# Patient Record
Sex: FEMALE | Race: WHITE
[De-identification: names, ages, dates, MRNs, and addresses within clinical notes are randomized per-mention and may not be internally consistent; named-entity substitution may affect disease eponyms.]

---

## 2018-06-15 ENCOUNTER — HOSPITAL ENCOUNTER (INPATIENT)
Dept: HOSPITAL 17 - H2EA | Age: 28
LOS: 3 days | Discharge: HOME | End: 2018-06-18
Attending: OBSTETRICS & GYNECOLOGY | Admitting: OBSTETRICS & GYNECOLOGY
Payer: MEDICAID

## 2018-06-15 VITALS
DIASTOLIC BLOOD PRESSURE: 61 MMHG | HEART RATE: 62 BPM | SYSTOLIC BLOOD PRESSURE: 114 MMHG | OXYGEN SATURATION: 100 % | RESPIRATION RATE: 17 BRPM

## 2018-06-15 VITALS — SYSTOLIC BLOOD PRESSURE: 120 MMHG | DIASTOLIC BLOOD PRESSURE: 59 MMHG

## 2018-06-15 VITALS — HEART RATE: 77 BPM | OXYGEN SATURATION: 100 %

## 2018-06-15 VITALS — HEART RATE: 61 BPM | OXYGEN SATURATION: 100 % | RESPIRATION RATE: 18 BRPM

## 2018-06-15 VITALS — BODY MASS INDEX: 34.41 KG/M2 | WEIGHT: 227.08 LBS | HEIGHT: 68 IN

## 2018-06-15 VITALS
RESPIRATION RATE: 20 BRPM | SYSTOLIC BLOOD PRESSURE: 123 MMHG | OXYGEN SATURATION: 100 % | DIASTOLIC BLOOD PRESSURE: 64 MMHG | HEART RATE: 66 BPM | TEMPERATURE: 97.7 F

## 2018-06-15 VITALS
SYSTOLIC BLOOD PRESSURE: 126 MMHG | DIASTOLIC BLOOD PRESSURE: 77 MMHG | RESPIRATION RATE: 15 BRPM | HEART RATE: 74 BPM | OXYGEN SATURATION: 100 %

## 2018-06-15 VITALS
HEART RATE: 58 BPM | RESPIRATION RATE: 16 BRPM | TEMPERATURE: 98.2 F | SYSTOLIC BLOOD PRESSURE: 100 MMHG | DIASTOLIC BLOOD PRESSURE: 60 MMHG

## 2018-06-15 VITALS
DIASTOLIC BLOOD PRESSURE: 74 MMHG | SYSTOLIC BLOOD PRESSURE: 128 MMHG | RESPIRATION RATE: 18 BRPM | OXYGEN SATURATION: 100 % | HEART RATE: 74 BPM

## 2018-06-15 VITALS — TEMPERATURE: 97.7 F

## 2018-06-15 VITALS — HEART RATE: 76 BPM

## 2018-06-15 VITALS — DIASTOLIC BLOOD PRESSURE: 58 MMHG | SYSTOLIC BLOOD PRESSURE: 115 MMHG

## 2018-06-15 VITALS — DIASTOLIC BLOOD PRESSURE: 66 MMHG | SYSTOLIC BLOOD PRESSURE: 115 MMHG

## 2018-06-15 VITALS — RESPIRATION RATE: 17 BRPM

## 2018-06-15 VITALS — HEART RATE: 81 BPM

## 2018-06-15 DIAGNOSIS — Z3A.39: ICD-10-CM

## 2018-06-15 DIAGNOSIS — Z87.891: ICD-10-CM

## 2018-06-15 LAB
BASOPHILS # BLD AUTO: 0 TH/MM3 (ref 0–0.2)
BASOPHILS NFR BLD: 0.4 % (ref 0–2)
COLOR UR: YELLOW
EOSINOPHIL # BLD: 0.1 TH/MM3 (ref 0–0.4)
EOSINOPHIL NFR BLD: 0.6 % (ref 0–4)
ERYTHROCYTE [DISTWIDTH] IN BLOOD BY AUTOMATED COUNT: 12.6 % (ref 11.6–17.2)
GLUCOSE UR STRIP-MCNC: (no result) MG/DL
HCT VFR BLD CALC: 37.1 % (ref 35–46)
HGB BLD-MCNC: 12.5 GM/DL (ref 11.6–15.3)
HGB UR QL STRIP: (no result)
KETONES UR STRIP-MCNC: (no result) MG/DL
LYMPHOCYTES # BLD AUTO: 1.5 TH/MM3 (ref 1–4.8)
LYMPHOCYTES NFR BLD AUTO: 16.4 % (ref 9–44)
MCH RBC QN AUTO: 30.4 PG (ref 27–34)
MCHC RBC AUTO-ENTMCNC: 33.8 % (ref 32–36)
MCV RBC AUTO: 90 FL (ref 80–100)
MONOCYTE #: 0.6 TH/MM3 (ref 0–0.9)
MONOCYTES NFR BLD: 6.9 % (ref 0–8)
MUCOUS THREADS #/AREA URNS LPF: (no result) /LPF
NEUTROPHILS # BLD AUTO: 7.1 TH/MM3 (ref 1.8–7.7)
NEUTROPHILS NFR BLD AUTO: 75.7 % (ref 16–70)
NITRITE UR QL STRIP: (no result)
PLATELET # BLD: 280 TH/MM3 (ref 150–450)
PMV BLD AUTO: 8.1 FL (ref 7–11)
RBC # BLD AUTO: 4.12 MIL/MM3 (ref 4–5.3)
SP GR UR STRIP: 1.03 (ref 1–1.03)
SQUAMOUS #/AREA URNS HPF: 12 /HPF (ref 0–5)
URINE LEUKOCYTE ESTERASE: (no result)
WBC # BLD AUTO: 9.3 TH/MM3 (ref 4–11)

## 2018-06-15 PROCEDURE — 86901 BLOOD TYPING SEROLOGIC RH(D): CPT

## 2018-06-15 PROCEDURE — 81001 URINALYSIS AUTO W/SCOPE: CPT

## 2018-06-15 PROCEDURE — 86850 RBC ANTIBODY SCREEN: CPT

## 2018-06-15 PROCEDURE — 85461 HEMOGLOBIN FETAL: CPT

## 2018-06-15 PROCEDURE — 85025 COMPLETE CBC W/AUTO DIFF WBC: CPT

## 2018-06-15 PROCEDURE — 59025 FETAL NON-STRESS TEST: CPT

## 2018-06-15 PROCEDURE — 90384 RH IG FULL-DOSE IM: CPT

## 2018-06-15 PROCEDURE — 80307 DRUG TEST PRSMV CHEM ANLYZR: CPT

## 2018-06-15 PROCEDURE — 86900 BLOOD TYPING SEROLOGIC ABO: CPT

## 2018-06-15 PROCEDURE — 90715 TDAP VACCINE 7 YRS/> IM: CPT

## 2018-06-15 RX ADMIN — CEFAZOLIN SODIUM SCH MLS/HR: 2 SOLUTION INTRAVENOUS at 23:59

## 2018-06-15 RX ADMIN — ACETAMINOPHEN SCH MLS/HR: 10 INJECTION, SOLUTION INTRAVENOUS at 23:59

## 2018-06-15 NOTE — HHI.HP
History & Physical


H&P





Patient Name: Ericka Shaffer Unit Number: [id number]


YOB: 1990 Patient Status: Registered Clinic


Attending Doctor: Iker Koenig MD Account Number: [id number]


   


 History & Physical 


History & Physical


H&P


PREOP  CONSULTATION








HPI:  Pt is a 26y/o  @ 39.4wks.  She presents today for  

consultation.  No LOF, VB, ctx.  +FM.  She has PNC at [facility].  She was 

seen in clinic today and found to be breech presentation with ALONA 7cm.  She is 

RH neg (s/p Rhogam).





OBHx:  


TAB x2





PMH:   


denies





PSH:   


wisdom teeth extraction


L clavicular fracture (plated/screwed)





FH:   


denies





SH:  


quit smoking with pregnancy (prior 1PPD x10yrs)


w/e EtOH prior to pregnancy


denies illicits





Meds:   


PNVs





Aller:  


NKDA





PE:  


General:  well developed, well nourished, no acute distress


HEENT:  normocephalic atraumatic, extraocular movements intact, neck supple 


Extremities:  full range of motion


Skin:  normal coloration, no rashes, no suspicious skin lesions noted


Neurologic:  cranial nerves 2-12 grossly intact, normal muscle tone, normal gait


Psychiatric:  normal mood and affect, appropriate





Pertinent Labs:


Rh neg





A/P:  26y/o  @ 39.4wks with breech presentation.





Pt was counseled against breech delivery.  Advised of option for ECV, however 

given advanced gestational age and lower ALONA (7cm), unlikely to be successful.  

She was advised on 1'CS which she agreed to.  Scheduled for Friday, 6/15 @ 15:

30 with Dr. Nelson.  Pt given handout which was reviewed.











Iker Koenig MD Kip 15, 2018 09:58











Daryl Tripp II, MD Kip 15, 2018 14:45

## 2018-06-15 NOTE — MP
cc:

Daryl Tripp MD, Bill L MD

****

 

 

DATE OF OPERATION:

06/15/2018

 

PREOPERATIVE DIAGNOSES:

Term intrauterine pregnancy at 39 weeks, breech presentation for C 

section.

 

POSTOPERATIVE DIAGNOSES:

Term intrauterine pregnancy at 39 weeks, breech presentation for C 

section.

 

PROCEDURE PERFORMED:

Primary low transverse  section.

 

SURGEON:

Daryl Tripp MD

 

ASSISTANT:

Will.

 

ANESTHESIA:

Spinal.

 

PREOPERATIVE NOTE:

The patient is a 27-year-old  female who is G3, P0, A2 at 39 

weeks, found to be breech yesterday when she came here for her visit 

in the ED and then scheduled for  today.

 

PROCEDURE IN DETAIL:

The patient was taken to the operating room and placed in supine 

position on the operating table.  Adequate spinal anesthesia was 

administered.  She was prepped and draped for abdominal surgery.  A 

Pfannenstiel incision was made in lower abdomen and carried to fascia 

sharply.  The fascia was dissected off the rectus muscle and the 

rectus split in the midline and peritoneal cavity entered sharply.  

The incision was extended superiorly and inferiorly.  The bladder 

placed in lower uterine incision.  The visceral peritoneum reflected 

off the lower uterine segment and placed on the bladder blade.  A 

transverse hysterotomy was extended and the baby was delivered from a 

ethan breech presentation at 3:59 p.m., weight 3270 grams, Apgar 8 and

8, a female infant.  There were no complications to delivery.  Delayed

cord clamping noted and cord blood obtained.  The placenta manually 

extracted.  The hysterotomy closed in running layer of 0 chromic, 

followed by imbricating suture of same.  Hemostasis was achieved. The 

was elevated, blood suctioned from cul-de-sac and gutters and the 

uterus replaced in peritoneal cavity.  The bladder peritoneum was 

reapproximated with 2-0 Vicryl running suture.  The parietal 

peritoneum closed in running layer of 2-0 chromic.  The rectus muscle 

reapproximated with stick ties of chromic.  The fascia was then closed

in a running layer of 0 Vicryl, subcutaneous tissues with a 3-0 plain 

catgut suture and the skin closed with 3-0 Monocryl subcuticular 

stitch.  Pressure dressing applied.  The estimated blood loss 500 mL. 

There were no complications.  Sponge and needle correct x2.  The 

patient was taken to the recovery room in stable condition.

 

 

__________________________________

MD WILLI Ramsey/

D: 06/15/2018, 05:10 PM

T: 06/15/2018, 05:32 PM

Visit #: [id number]

Job #: [id number]

## 2018-06-16 VITALS
SYSTOLIC BLOOD PRESSURE: 119 MMHG | HEART RATE: 65 BPM | RESPIRATION RATE: 17 BRPM | TEMPERATURE: 98.2 F | DIASTOLIC BLOOD PRESSURE: 66 MMHG

## 2018-06-16 VITALS
TEMPERATURE: 97.8 F | DIASTOLIC BLOOD PRESSURE: 70 MMHG | SYSTOLIC BLOOD PRESSURE: 110 MMHG | RESPIRATION RATE: 18 BRPM | HEART RATE: 69 BPM

## 2018-06-16 VITALS
RESPIRATION RATE: 18 BRPM | TEMPERATURE: 97.9 F | HEART RATE: 63 BPM | DIASTOLIC BLOOD PRESSURE: 69 MMHG | SYSTOLIC BLOOD PRESSURE: 125 MMHG | OXYGEN SATURATION: 96 %

## 2018-06-16 VITALS
RESPIRATION RATE: 16 BRPM | SYSTOLIC BLOOD PRESSURE: 121 MMHG | HEART RATE: 62 BPM | DIASTOLIC BLOOD PRESSURE: 61 MMHG | TEMPERATURE: 98.5 F

## 2018-06-16 VITALS
HEART RATE: 70 BPM | DIASTOLIC BLOOD PRESSURE: 62 MMHG | OXYGEN SATURATION: 97 % | RESPIRATION RATE: 20 BRPM | SYSTOLIC BLOOD PRESSURE: 109 MMHG | TEMPERATURE: 98 F

## 2018-06-16 VITALS — TEMPERATURE: 97.7 F

## 2018-06-16 RX ADMIN — IBUPROFEN PRN MG: 600 TABLET, FILM COATED ORAL at 20:14

## 2018-06-16 RX ADMIN — OXYCODONE HYDROCHLORIDE AND ACETAMINOPHEN PRN TAB: 5; 325 TABLET ORAL at 22:09

## 2018-06-16 RX ADMIN — OXYCODONE HYDROCHLORIDE AND ACETAMINOPHEN PRN TAB: 5; 325 TABLET ORAL at 07:52

## 2018-06-16 RX ADMIN — OXYCODONE HYDROCHLORIDE AND ACETAMINOPHEN PRN TAB: 5; 325 TABLET ORAL at 13:05

## 2018-06-16 RX ADMIN — OXYCODONE HYDROCHLORIDE AND ACETAMINOPHEN PRN TAB: 5; 325 TABLET ORAL at 17:56

## 2018-06-16 RX ADMIN — CEFAZOLIN SODIUM SCH MLS/HR: 2 SOLUTION INTRAVENOUS at 07:03

## 2018-06-16 RX ADMIN — STANDARDIZED SENNA CONCENTRATE AND DOCUSATE SODIUM PRN TAB: 8.6; 5 TABLET, FILM COATED ORAL at 07:52

## 2018-06-16 RX ADMIN — SIMETHICONE CHEW TAB 80 MG PRN MG: 80 TABLET ORAL at 07:52

## 2018-06-16 RX ADMIN — STANDARDIZED SENNA CONCENTRATE AND DOCUSATE SODIUM PRN TAB: 8.6; 5 TABLET, FILM COATED ORAL at 20:14

## 2018-06-16 RX ADMIN — ACETAMINOPHEN SCH MLS/HR: 10 INJECTION, SOLUTION INTRAVENOUS at 07:38

## 2018-06-16 RX ADMIN — IBUPROFEN PRN MG: 600 TABLET, FILM COATED ORAL at 13:51

## 2018-06-16 NOTE — HHI.OB
Subjective


Post Operative Day:  1


Remarks


27-year-old  delivered at 39 weeks 4 days .  Patient is postop day #1 for 

primary  for breech.  Patient's pain has increased this morning and 

she is going to take Percocet for the pain.  She has only been on IV Tylenol 

since the delivery.  She is eating and drinking without difficulties.  No nausea

/vomiting.  Patient has not passed gas or had a bowel movement yet.  She is 

undecided about contraception and plans on breast-feeding, interested in 

minipill.





Objective


Vitals/I&O





Vital Signs








  Date Time  Temp Pulse Resp B/P (MAP) Pulse Ox O2 Delivery O2 Flow Rate FiO2


 


18 07:39  63 18 125/69 (87) 96   


 


18 07:39 97.9       


 


18 04:26 98.2 65 17 119/66 (83)    


 


18 00:13 98.5 62 16 121/61 (81)    


 


6/15/18 20:43 98.2 58 16 100/60 (73)    


 


6/15/18 18:31  74 15 126/77 (93) 100   


 


6/15/18 18:17 97.7       


 


6/15/18 18:15  62 17 114/61 (78) 100   


 


6/15/18 18:05    115/58 (77)    


 


6/15/18 18:00  61 18  100   


 


6/15/18 17:46  74 18 128/74 (92) 100   


 


6/15/18 17:31    120/59 (79)    


 


6/15/18 17:26   17     


 


6/15/18 17:25  77      


 


6/15/18 17:25     100   


 


6/15/18 17:18     100   


 


6/15/18 17:18 97.7 66 20  97   


 


6/15/18 17:18    123/64 (83)    


 


6/15/18 17:11    115/66 (82)    


 


6/15/18 14:55  76      


 


6/15/18 14:50  81      








Result Diagram:  


6/15/18 1400





Objective Remarks


GENERAL: Well-nourished, well-developed patient.


CARDIOVASCULAR: Regular rate and rhythm without murmurs, gallops, or rubs. 


RESPIRATORY: Breath sounds equal bilaterally. No accessory muscle use.


ABDOMEN/GI: Abdomen soft, non-tender, bowel sounds present. 


   Incision: Clean, dry and intact.


   Fundus: Firm, non-tender at umbilicus.


GENITOURINARY: Light to moderate bleeding.


EXTREMITIES: No cyanosis or edema, non-tender, without signs of DVT.


Medications and IVs





Current Medications








 Medications


  (Trade)  Dose


 Ordered  Sig/Colin


 Route  Start Time


 Stop Time Status Last Admin


 


 Lactated Ringer's  1,000 ml @ 


 150 mls/hr  Q6H40M


 IV  6/15/18 15:05


    6/15/18 14:54


 


 


 Cefazolin Sodium/


 Dextrose  50 ml @ 


 100 mls/hr  ON  CALL


 IV  6/15/18 15:45


 18 15:44  6/15/18 15:20


 


 


  (Bicitra Liq)  30 ml  ON  CALL


 PO  6/15/18 16:15


 18 16:14  6/15/18 15:20


 


 


 Lactated Ringer's  1,000 ml @ 


 100 mls/hr  Q10H


 IV  6/15/18 22:04


 18 18:03  18 00:00


 


 


 Oxytocin  500 ml @ 


 100 mls/hr  UNSCH X1  PRN


 IV  6/15/18 22:15


 18 22:14   


 


 


  (NS Flush)  2 ml  BID


 IV FLUSH  6/15/18 21:00


     


 


 


  (NS Flush)  2 ml  UNSCH  PRN


 IV FLUSH  6/15/18 17:15


     


 


 


  (Mylicon Chew)  80 mg  QID  PRN


 PO  6/15/18 17:15


    18 07:52


 


 


  (Tylenol)  650 mg  Q6H  PRN


 PO  6/15/18 17:15


     


 


 


  (Motrin)  600 mg  Q6H  PRN


 PO  6/15/18 17:15


     


 


 


  (Toradol Inj)  60 mg  UNSCH X1  PRN


 IM  6/15/18 17:15


 18 17:14  18 07:38


 


 


  (Percocet  5-325


 Mg)  1 tab  Q4H  PRN


 PO  6/15/18 17:15


     


 


 


  (Percocet  5-325


 Mg)  2 tab  Q4H  PRN


 PO  6/15/18 17:15


    18 07:52


 


 


  (Harriet-Colace)  2 tab  Q12H  PRN


 PO  6/15/18 17:15


    18 07:52


 


 


  (Ambien)  5 mg  HS  PRN


 PO  6/15/18 17:15


     


 


 


  (M-M-R Ii Inj)  0.5 ml  ONCE ONCE


 SQ  18 16:00


 18 16:01   


 


 


  (Boostrix Inj)  0.5 ml  ONCE ONCE


 IM  18 16:00


 18 16:01   


 


 


  (Zofran Inj)  4 mg  Q6H  PRN


 IV PUSH  6/15/18 17:15


     


 


 


 Acetaminophen  100 ml @ 


 400 mls/hr  Q8H


 IV  6/15/18 16:00


    6/15/18 23:59


 


 


  (Misc Nursing


 Information)  NO SYSTEMIC


 NARCOTICS TO BE


 GIVEN FO...  UNSCH  PRN


 .XX  6/15/18 15:31


 18 15:30   


 


 


  (Narcan Inj)  0.4 mg  UNSCH  PRN


 IV PUSH  6/15/18 15:31


 18 15:30   


 


 


  (Benadryl Inj)  25 mg  Q6H  PRN


 IV PUSH  6/15/18 15:31


 18 15:30   


 


 


  (Benadryl)  50 mg  Q6H  PRN


 PO  6/15/18 15:31


 18 15:30   


 


 


  (Misc Nursing


 Information)  ALL


 NURSING


 DEPARTMENTS  UNSCH  PRN


 .XX  6/15/18 15:31


 18 15:30   


 











Assessment/Plan


Assessment and Plan


She is 27-year-old  delivered at 39 weeks 4 days for primary  for 

breech presentation.  Patient is postop day 1.  Patient counseled on 6 weeks of 

pelvic rest.  Patient counseled to follow-up in 1 week for incision check.  

Patient counseled on contraceptive options and advised to follow-up with her OB/

GYN at her first visit.


-Vital signs stable


-Continue routine postpartum care


-Motrin and Percocet as needed for pain


-Encourage out of bed as tolerated


-Pelvic rest 6 weeks and follow-up with OB/GYN in 6 weeks.  Follow-up with OB/

GYN in 1 week for incision check


-Contraception: Undecided, considering minipill


-Anticipate discharge in 1-2 days











Julee Wiggins MD R2 2018 08:07

## 2018-06-17 VITALS
DIASTOLIC BLOOD PRESSURE: 54 MMHG | SYSTOLIC BLOOD PRESSURE: 103 MMHG | RESPIRATION RATE: 18 BRPM | HEART RATE: 68 BPM | TEMPERATURE: 98.3 F

## 2018-06-17 VITALS
RESPIRATION RATE: 18 BRPM | DIASTOLIC BLOOD PRESSURE: 70 MMHG | SYSTOLIC BLOOD PRESSURE: 124 MMHG | TEMPERATURE: 97.9 F | HEART RATE: 72 BPM

## 2018-06-17 VITALS
SYSTOLIC BLOOD PRESSURE: 118 MMHG | RESPIRATION RATE: 12 BRPM | OXYGEN SATURATION: 98 % | DIASTOLIC BLOOD PRESSURE: 58 MMHG | TEMPERATURE: 98 F | HEART RATE: 75 BPM

## 2018-06-17 PROCEDURE — 3E0334Z INTRODUCTION OF SERUM, TOXOID AND VACCINE INTO PERIPHERAL VEIN, PERCUTANEOUS APPROACH: ICD-10-PCS | Performed by: OBSTETRICS & GYNECOLOGY

## 2018-06-17 RX ADMIN — SIMETHICONE CHEW TAB 80 MG PRN MG: 80 TABLET ORAL at 08:26

## 2018-06-17 RX ADMIN — IBUPROFEN PRN MG: 600 TABLET, FILM COATED ORAL at 18:39

## 2018-06-17 RX ADMIN — OXYCODONE HYDROCHLORIDE AND ACETAMINOPHEN PRN TAB: 5; 325 TABLET ORAL at 02:55

## 2018-06-17 RX ADMIN — SIMETHICONE CHEW TAB 80 MG PRN MG: 80 TABLET ORAL at 18:39

## 2018-06-17 RX ADMIN — OXYCODONE HYDROCHLORIDE AND ACETAMINOPHEN PRN TAB: 5; 325 TABLET ORAL at 08:27

## 2018-06-17 RX ADMIN — STANDARDIZED SENNA CONCENTRATE AND DOCUSATE SODIUM PRN TAB: 8.6; 5 TABLET, FILM COATED ORAL at 08:26

## 2018-06-17 RX ADMIN — IBUPROFEN PRN MG: 600 TABLET, FILM COATED ORAL at 02:56

## 2018-06-17 RX ADMIN — OXYCODONE HYDROCHLORIDE AND ACETAMINOPHEN PRN TAB: 5; 325 TABLET ORAL at 12:36

## 2018-06-17 RX ADMIN — OXYCODONE HYDROCHLORIDE AND ACETAMINOPHEN PRN TAB: 5; 325 TABLET ORAL at 18:39

## 2018-06-17 RX ADMIN — IBUPROFEN PRN MG: 600 TABLET, FILM COATED ORAL at 12:37

## 2018-06-17 NOTE — HHI.OB
Subjective


Post Operative Day:  2


Remarks


Postoperative day number 2. AFVSS overnight.  Pain controlled with medications. 

Incision not draining. Decreased lochia.  Denies dysuria.  No breast 

tenderness.  Appetite good.  No nausea or vomiting.  Endorses flatus.  No bowel 

movement.  Ambulating well.  Denies calf pain, shortness of breath, or cough.  

Otherwise, she is doing well this morning and has no other complaints.





Objective


Vitals/I&O





Vital Signs








  Date Time  Temp Pulse Resp B/P (MAP) Pulse Ox O2 Delivery O2 Flow Rate FiO2


 


18 07:30 98.0 75 12  98   


 


18 07:30    118/58 (78)    


 


18 00:00 98.3 68 18 103/54 (70)    


 


18 20:00 97.8 69 18 110/70 (83)    


 


18 13:57 97.7       


 


18 12:00 98.0 70 20 109/62 (78) 97   








Result Diagram:  


6/15/18 1400





Objective Remarks


GENERAL: Well-nourished, well-developed patient.


CARDIOVASCULAR: Regular rate and rhythm without murmurs, gallops, or rubs. 


RESPIRATORY: Breath sounds equal bilaterally. No accessory muscle use.


ABDOMEN/GI: Abdomen soft, non-tender, bowel sounds present. 


   Incision: Clean, dry and intact.


   Fundus: Firm, non-tender at umbilicus.


GENITOURINARY: Light to moderate bleeding.


EXTREMITIES: No cyanosis or edema, non-tender, without signs of DVT.


Medications and IVs





Current Medications








 Medications


  (Trade)  Dose


 Ordered  Sig/[facility]


 Route  Start Time


 Stop Time Status Last Admin


 


 Lactated Ringer's  1,000 ml @ 


 150 mls/hr  Q6H40M


 IV  6/15/18 15:05


    6/15/18 14:54


 


 


 Cefazolin Sodium/


 Dextrose  50 ml @ 


 100 mls/hr  ON  CALL


 IV  6/15/18 15:45


 18 15:44  6/15/18 15:20


 


 


  (Bicitra Liq)  30 ml  ON  CALL


 PO  6/15/18 16:15


 18 16:14  6/15/18 15:20


 


 


  (NS Flush)  2 ml  BID


 IV FLUSH  6/15/18 21:00


     


 


 


  (NS Flush)  2 ml  UNSCH  PRN


 IV FLUSH  6/15/18 17:15


     


 


 


  (Mylicon Chew)  80 mg  QID  PRN


 PO  6/15/18 17:15


    18 08:26


 


 


  (Tylenol)  650 mg  Q6H  PRN


 PO  6/15/18 17:15


     


 


 


  (Motrin)  600 mg  Q6H  PRN


 PO  6/15/18 17:15


    18 02:56


 


 


  (Percocet  5-325


 Mg)  1 tab  Q4H  PRN


 PO  6/15/18 17:15


     


 


 


  (Percocet  5-325


 Mg)  2 tab  Q4H  PRN


 PO  6/15/18 17:15


    18 08:27


 


 


  (Harriet-Colace)  2 tab  Q12H  PRN


 PO  6/15/18 17:15


    18 08:26


 


 


  (Ambien)  5 mg  HS  PRN


 PO  6/15/18 17:15


     


 


 


  (Zofran Inj)  4 mg  Q6H  PRN


 IV PUSH  6/15/18 17:15


     


 


 


 Acetaminophen  100 ml @ 


 400 mls/hr  Q8H


 IV  6/15/18 16:00


    6/15/18 23:59


 











Assessment/Plan


Assessment and Plan


She is 27-year-old  delivered at 39 weeks 4 days for primary  for 

breech presentation.  Patient is postop day 2.  


Patient counseled on 6 weeks of pelvic rest.  Patient counseled to follow-up in 

1 week for incision check.  Patient counseled on contraceptive options and 

advised to follow-up with her OB/GYN at her first visit.


-Vital signs stable


-Continue routine postpartum care


-Motrin and Percocet as needed for pain


-Encourage out of bed as tolerated


-Pelvic rest 6 weeks and follow-up with OB/GYN in 6 weeks.  Follow-up with OB/

GYN in 1 week for incision check


-Contraception: Undecided, considering minipill


-Anticipate discharge today/tomorrow











Álvaro Shannon MD R2 2018 09:38

## 2018-06-18 RX ADMIN — STANDARDIZED SENNA CONCENTRATE AND DOCUSATE SODIUM PRN TAB: 8.6; 5 TABLET, FILM COATED ORAL at 00:51

## 2018-06-18 RX ADMIN — IBUPROFEN PRN MG: 600 TABLET, FILM COATED ORAL at 00:51

## 2018-06-18 RX ADMIN — STANDARDIZED SENNA CONCENTRATE AND DOCUSATE SODIUM PRN TAB: 8.6; 5 TABLET, FILM COATED ORAL at 12:34

## 2018-06-18 RX ADMIN — OXYCODONE HYDROCHLORIDE AND ACETAMINOPHEN PRN TAB: 5; 325 TABLET ORAL at 07:02

## 2018-06-18 RX ADMIN — OXYCODONE HYDROCHLORIDE AND ACETAMINOPHEN PRN TAB: 5; 325 TABLET ORAL at 12:34

## 2018-06-18 RX ADMIN — IBUPROFEN PRN MG: 600 TABLET, FILM COATED ORAL at 07:02

## 2018-06-18 RX ADMIN — OXYCODONE HYDROCHLORIDE AND ACETAMINOPHEN PRN TAB: 5; 325 TABLET ORAL at 00:51

## 2018-06-18 RX ADMIN — IBUPROFEN PRN MG: 600 TABLET, FILM COATED ORAL at 12:34

## 2018-06-18 NOTE — HHI.DCPOC
Discharge Care Plan


Diagnosis:  


(1)  delivery delivered


Report Symptoms to Your Doctor


-Temperature above 100.5 degrees


-Redness, of incision or excessive or foul smelling drainage


-Unusual pain or calf pain


-Increased vaginal bleeding


-Painful or difficulty urinating


-Feelings of extreme sadness or anxiety after 2 weeks


Goals to Promote Your Health


* To prevent worsening of your condition and complications


* To maintain your health at the optimal level


Directions to Meet Your Goals


*** Take your medications as prescribed


*** Follow your dietary instruction


*** Follow activity as directed


*** Ensure plenty of rest for recovery


*** Drink fluids for hydration








*** Keep your appointments as scheduled


*** Take your immunizations and boosters as scheduled


*** If your symptoms worsen call your PCP, if no PCP go to Urgent Care Center 

or Emergency Room***


*** Smoking is Dangerous to Your Health. Avoid second hand smoke***


***Call the 24-hour crisis hotline for domestic abuse at 1-971.959.5379***











Juan Luis Cavazos MD R1 2018 09:58

## 2018-06-18 NOTE — HHI.OB
Subjective


Remarks


Postoperative day number 3. AFVSS overnight.  Pain controlled with medications. 

Incision not draining. Decreased lochia.  Denies dysuria.  No breast 

tenderness.  Appetite good.  No nausea or vomiting.  Endorses flatus and bowel 

movement.  Ambulating well.  Denies calf pain, shortness of breath, or cough.  

Otherwise, she is doing well this morning and has no other complaints.





Objective


Vitals/I&O





Vital Signs








  Date Time  Temp Pulse Resp B/P (MAP) Pulse Ox O2 Delivery O2 Flow Rate FiO2


 


18 20:00  72  124/70 (88)    


 


18 20:00 97.9  18     








Objective Remarks


GENERAL: Well-nourished, well-developed patient.


CARDIOVASCULAR: Regular rate and rhythm without murmurs, gallops, or rubs. 


RESPIRATORY: Breath sounds equal bilaterally. No accessory muscle use.


ABDOMEN/GI: Abdomen soft, non-tender. 


   Fundus: Firm, non-tender at umbilicus.


GENITOURINARY: Light to moderate bleeding.


EXTREMITIES: No cyanosis or edema, non-tender, without signs of DVT.


Medications and IVs





Current Medications








 Medications


  (Trade)  Dose


 Ordered  Sig/Colin


 Route  Start Time


 Stop Time Status Last Admin


 


 Lactated Ringer's  1,000 ml @ 


 150 mls/hr  Q6H40M


 IV  6/15/18 15:05


    6/15/18 14:54


 


 


 Cefazolin Sodium/


 Dextrose  50 ml @ 


 100 mls/hr  ON  CALL


 IV  6/15/18 15:45


 18 15:44  6/15/18 15:20


 


 


  (Bicitra Liq)  30 ml  ON  CALL


 PO  6/15/18 16:15


 18 16:14  6/15/18 15:20


 


 


  (NS Flush)  2 ml  BID


 IV FLUSH  6/15/18 21:00


     


 


 


  (NS Flush)  2 ml  UNSCH  PRN


 IV FLUSH  6/15/18 17:15


     


 


 


  (Mylicon Chew)  80 mg  QID  PRN


 PO  6/15/18 17:15


    18 18:39


 


 


  (Tylenol)  650 mg  Q6H  PRN


 PO  6/15/18 17:15


     


 


 


  (Motrin)  600 mg  Q6H  PRN


 PO  6/15/18 17:15


    18 07:02


 


 


  (Percocet  5-325


 Mg)  1 tab  Q4H  PRN


 PO  6/15/18 17:15


     


 


 


  (Percocet  5-325


 Mg)  2 tab  Q4H  PRN


 PO  6/15/18 17:15


    18 07:02


 


 


  (Harriet-Colace)  2 tab  Q12H  PRN


 PO  6/15/18 17:15


    18 00:51


 


 


  (Ambien)  5 mg  HS  PRN


 PO  6/15/18 17:15


     


 


 


  (Zofran Inj)  4 mg  Q6H  PRN


 IV PUSH  6/15/18 17:15


     


 


 


  (Tylenol)  650 mg  Q8H


 PO  18 16:00


     


 











Assessment/Plan


Assessment and Plan


She is 27-year-old  delivered at 39 weeks 4 days for primary  for 

breech presentation.  Patient is postop day 3.  


Patient counseled on 6 weeks of pelvic rest.  Patient counseled to follow-up in 

1 week for incision check.  Patient counseled on contraceptive options and 

advised to follow-up with her OB/GYN at her first visit.


-Vital signs stable


-Continue routine postpartum care


-Motrin and Percocet as needed for pain


-Encourage out of bed as tolerated


-Pelvic rest 6 weeks and follow-up with OB/GYN in 6 weeks.  Follow-up with OB/

GYN in 1 week for incision check


-Contraception:Will arrange with OB


-Anticipate discharge today











Juan Luis Cavazos MD R1 2018 09:57